# Patient Record
Sex: MALE | Race: WHITE | NOT HISPANIC OR LATINO
[De-identification: names, ages, dates, MRNs, and addresses within clinical notes are randomized per-mention and may not be internally consistent; named-entity substitution may affect disease eponyms.]

---

## 2024-08-05 ENCOUNTER — APPOINTMENT (OUTPATIENT)
Dept: UROLOGY | Facility: CLINIC | Age: 69
End: 2024-08-05

## 2024-08-05 PROBLEM — Z78.9 DRINKS BEER: Status: ACTIVE | Noted: 2024-08-05

## 2024-08-05 PROBLEM — R33.9 URINARY RETENTION: Status: ACTIVE | Noted: 2024-08-05

## 2024-08-05 PROBLEM — Z87.891 FORMER SMOKER: Status: ACTIVE | Noted: 2024-08-05

## 2024-08-05 PROBLEM — N52.9 ERECTILE DISORDER: Status: ACTIVE | Noted: 2024-08-05

## 2024-08-05 PROCEDURE — 99204 OFFICE O/P NEW MOD 45 MIN: CPT | Mod: 25

## 2024-08-05 PROCEDURE — 51798 US URINE CAPACITY MEASURE: CPT

## 2024-08-05 PROCEDURE — 51741 ELECTRO-UROFLOWMETRY FIRST: CPT

## 2024-08-06 PROBLEM — Z00.00 ENCOUNTER FOR PREVENTIVE HEALTH EXAMINATION: Status: ACTIVE | Noted: 2024-08-02

## 2024-08-06 PROBLEM — N48.89 ATROPHY OF CORPUS CAVERNOSUM: Status: ACTIVE | Noted: 2024-08-06

## 2024-08-06 PROBLEM — N48.6 PEYRONIE'S DISEASE: Status: ACTIVE | Noted: 2024-08-06

## 2024-08-06 PROBLEM — N48.89 PENILE CURVATURE, ACQUIRED: Status: ACTIVE | Noted: 2024-08-06

## 2024-08-06 PROBLEM — R35.0 URINARY FREQUENCY: Status: ACTIVE | Noted: 2024-08-05

## 2024-08-06 NOTE — ASSESSMENT
[FreeTextEntry1] : BLADDER SCAN: Indication: Increased frequency of urination. Initial Volume:  232  cc PVR:  31 cc Results: Urinary retention Recommendations: No Therapy Needed.  URO FLOWMETRY: Indication: Increased frequency of urination. Urinary Flow Rate: 9.6   m/s Results: Urinary retention Recommendations: No therapy needed.

## 2024-08-06 NOTE — PHYSICAL EXAM
[General Appearance - Well Developed] : well developed [General Appearance - Well Nourished] : well nourished [Heart Rate And Rhythm] : heart rate and rhythm were normal [] : no respiratory distress [Respiration, Rhythm And Depth] : normal respiratory rhythm and effort [Bowel Sounds] : normal bowel sounds [Abdomen Soft] : soft [TextEntry] : On exam, pt measured: 9 cm w/o traction 14 cm w/ traction  The penis is circumcised.  Severe fibrosis and atrophy of the cavernosal bodies is noted on palpation.  The length of the penis is fixed and inelastic.  The stretched penile length is diminished.  The scrotum and testicles are normal.  The skin of the penile shaft and scrotum is clean and intact.

## 2024-08-06 NOTE — PLAN
[TextEntry] : A/P, 69M w/ ED for initial consult - pt is interested in IPP surgery - cardiac clearance - schedule Duplex and Pre-op testing - Start daily tadalafil 5mg/day, in addition to 10 mg prn The patient scheduled this consultation to discuss the different treatment options available for his organic erectile dysfunction. The following note describes the conversation that was performed today during the consultation.    I reviewed the Patient History Form which the patient filled out, made sure that his ailment was organic erectile dysfunction and I discussed in detail with him all previously tried treatments for his ED. We had a thorough discussion about all of the alternatives available, and I made sure to include in our discussion pills such as Levitra/Viagra/Cialis, as well as penile self-injectable therapies, MUSE (Medicated Urethral System for Erection), vacuum device, and penile implant.  I stressed the risks and benefits and pros and cons of each of these options extensively. A power point presentation was also used to illustrate each treatment option. The patient was also provided with a packet of written information as well as a list of patients to speak to on the phone.   In discussing penile implant surgery, the patient was made aware of the different types of penile implants- including semirigid devices, 2-piece or Ambicor (AMS) devices, and the inflatable penile prosthesis with 3 components. I went on to mention that there are 2 brands of devices, Coloplast and AMS, and that the AMS is impregnated with antibiotic (inhibizone), and the Coloplast is dipped then coated with an antibiotic. I also referred him to my website in order to obtain additional information about the types of implants available.  He felt he would defer to my judgment as to which device to use.   I also described the highlights and benefits of the "No-Touch" surgical technique and outcome data including number of procedures previously performed and updated rate of infection. In this initial discussion of the penile implant option, I made sure we had a very long and nika discussion about the risks.  I stated that, first and foremost, infection is the most dreaded risk and complication, which range in incidence from 1-3% of all cases performed in the [place], but that in my hands, using the "No-Touch" technique the incidence of infection is less than 1%.  I stated that should infection occur, the entire device would need to be removed, which typically happens in the first several weeks after surgery.  I explained that should this occur, there would likely be corporal fibrosis, scarring, penile shortening and even penile necrosis and disfigurement.  I said that while I would do absolutely everything possible to reduce and mitigate this risk, if it occurs, the device will have to be removed, and then a salvage procedure with a semi-rigid implant possibly done, or the device would have to be removed with delayed re-implantation, or simply avoid future surgery completely.  I explained what this salvage procedure is, and that a new implant could be placed in the same setting with a complex irrigation of antibiotics and saline lavage, but that the infection risk at this salvage procedure is even higher, up to 30%. Furthermore, the possible need for hospitalization, prolonged intravenous antibiotics and need further additional surgery was also discussed.  The patient was informed that if the salvage operation failed or if the infected implant were to be removed completely that significant shortening of the penis would occur making implantation of another device very difficult with very poor outcome and patient satisfaction. I explained that this is a real and significant risk that has to be weighed and considered.   Next, I expounded on the other risks of the operation.  These include injury to the urethra or bladder, and should these occur, the operation would have to be altered or aborted.  I explained that very rarely, vascular injury and bleeding can happen, and if iliac vein injury occurs from reservoir placement, this could be catastrophic and result in major blood loss and theoretically risk of leg loss in severe instances.   I went on to discuss that after the implant is placed, penile shortening could likely occur, and this is up to 1-2cm total.  Some of this is due to lack of glans engorgement, though MUSE could be used post-operatively to reduce this factor.  Next, I also explained the risk of dissatisfaction with the cosmetic or functional result of the device, meaning that he could simply be unhappy with the result.  Some people find that while they have a good full erection, they have changes in sensation, difficulty obtaining an orgasm, and dissatisfaction with sex in general.  I made sure he verbalized and demonstrated a good understanding of these points.   Next, I explained the risk of device breakage or failure, and future operations might be needed should this occur to fix the device tubing breakages with fluid leaks.  There is also a risk of auto-inflation, and even inability to successfully use the device due to technical considerations and inability to use or find the pump.  I did state that I would be available to him to teach him and train him to use his device, and also available to treat any other issue mentioned above such as device breakage or auto-inflation.   Next, we discussed the fact that rarely further minor surgery may be needed to make final adjustments to the penile implant. Reasons for this would be to adjust the length of the cylinders, reposition the pump or location of the reservoir.   Prior to scheduling surgery, the patient was asked to read the material, which is provided to him today, to see a cardiologist to obtain a medical clearance, to visit our website [email or web address]   to obtain additional information, to call patients who were previously implanted and to discuss his options with his partner. Before leaving the consultation, I made sure he verbalized understanding all the risk and benefits, and pros and cons of surgery.  He had the ability to ask questions, and I also explained to him what to expect from the surgery.  I made sure he had access to literature to read and offered him the ability to speak to prior patients of mine to get a sense of what to expect, and that these reports would hopefully be as unbiased as possible. If he remains interested in having an implant, he understands that he will need to schedule a penile Duplex ultrasound study during which measurements of the penis will be made.

## 2024-08-06 NOTE — HISTORY OF PRESENT ILLNESS
[FreeTextEntry1] : 69M w/ ED for initial consult Gradual onset of ED 3-5 yrs ago. Reports strong libido and desire. has tried oral PDE5i'sl ; as well daily tadalafil 5mg/day Pt is now interested in IPP surgery MADHAVI score 0  PMH - hyperlipidemia; depression PSH - B/L hernia repair w/ mesh, 1995 Pt has a cardiologist. On baby aspirin.  NO anticoagulant. pt is retired; formerly worked in construction.

## 2024-08-13 NOTE — REASON FOR VISIT
INR (no units)   Date Value   12/05/2022 2.4     INR therapeutic per [facility] anticoag protocol/MD/vo continue same dose recheck 4wks.     Left message for patient to call back     [Follow-up Visit ___] : a follow-up visit  for [unfilled]

## 2024-08-14 ENCOUNTER — APPOINTMENT (OUTPATIENT)
Dept: UROLOGY | Facility: CLINIC | Age: 69
End: 2024-08-14
Payer: MEDICARE

## 2024-08-14 VITALS
SYSTOLIC BLOOD PRESSURE: 126 MMHG | BODY MASS INDEX: 24.34 KG/M2 | DIASTOLIC BLOOD PRESSURE: 82 MMHG | HEIGHT: 70 IN | WEIGHT: 170 LBS | TEMPERATURE: 97.5 F

## 2024-08-14 DIAGNOSIS — R33.9 RETENTION OF URINE, UNSPECIFIED: ICD-10-CM

## 2024-08-14 DIAGNOSIS — N52.9 MALE ERECTILE DYSFUNCTION, UNSPECIFIED: ICD-10-CM

## 2024-08-14 DIAGNOSIS — N48.6 INDURATION PENIS PLASTICA: ICD-10-CM

## 2024-08-14 DIAGNOSIS — R35.0 FREQUENCY OF MICTURITION: ICD-10-CM

## 2024-08-14 DIAGNOSIS — N48.89 OTHER SPECIFIED DISORDERS OF PENIS: ICD-10-CM

## 2024-08-14 DIAGNOSIS — Z00.00 ENCOUNTER FOR GENERAL ADULT MEDICAL EXAMINATION W/OUT ABNORMAL FINDINGS: ICD-10-CM

## 2024-08-14 DIAGNOSIS — N52.02 CORPORO-VENOUS OCCLUSIVE ERECTILE DYSFUNCTION: ICD-10-CM

## 2024-08-14 PROCEDURE — 54235 NJX CORPORA CAVERNOSA RX AGT: CPT

## 2024-08-14 PROCEDURE — 99215 OFFICE O/P EST HI 40 MIN: CPT | Mod: 25

## 2024-08-14 PROCEDURE — 93980 PENILE VASCULAR STUDY: CPT

## 2024-08-14 NOTE — PHYSICAL EXAM
[General Appearance - Well Developed] : well developed [General Appearance - Well Nourished] : well nourished [Heart Rate And Rhythm] : heart rate and rhythm were normal [] : no respiratory distress [Respiration, Rhythm And Depth] : normal respiratory rhythm and effort [Bowel Sounds] : normal bowel sounds [Abdomen Soft] : soft [TextEntry] : The penis is circumcised.  Severe fibrosis and atrophy of the cavernosal bodies is noted on palpation.  The length of the penis is fixed and inelastic.  The stretched penile length is diminished.  The scrotum and testicles are normal.  The skin of the penile shaft and scrotum is clean and intact.

## 2024-08-14 NOTE — HISTORY OF PRESENT ILLNESS
[FreeTextEntry1] : 69M w/ ED for Duplex has tried oral PDE5i's. As well daily tadalafil 5mg/day Pt is now interested in IPP surgery At initial last wk, pt was re-started on 5 mg daily tadalafil  Pt also wants to consider Rezum for LUTS at a later date  PSH - B/L hernia repair w/ mesh, 1995 Pt has a cardiologist. On baby aspirin. No anticoagulant.

## 2024-08-14 NOTE — PLAN
[TextEntry] : A/P, 69M w/ ED for Duplex - duplex today - Pt is interested in IPP surgery (on baby aspirin, has cardiologist) - pt is interested in Rezum. Will consider doing 1-2 mo s/p IPP - cardiac clearance  The summary points of our discussion after the Duplex test are that: 1) The results of the Duplex study were reviewed with the patient and that his ailment is organic erectile dysfunction, refractory to other treatments, or at least other options were offered but rejected. 2) The proposed treatment is the inflatable, permanent, penile prosthesis that we have discussed in great detail at outlined in our previous discussion. 3) The probability of success is over 90%, but this depends on whether or not there are complications.  The complications can be very serious and certainly can occur, including infection, breakage, and injury to surrounding structures.  These events would quality as a "failure", and he understands this completely. 4) The risks are outlined above, but infection is 1-2% in the best circumstances, the chance of other events happening is low but possible, including urethral perforation, bladder perforation, or device breakage. With all of this stated, he decided in light of all of this discussion and different treatment options available, he would like to move forward with 3 piece inflatable penile prosthesis placement.  He was informed that penile implant surgery is an end of the line therapy, and once this is undertaken, one cannot go back and attempt to use injections or pills and expect these to work should the implant be removed for infection or fail to be satisfactory. He understands that he will need to be medically cleared before proceeding with scheduling a dated for the procedure. Following the Duplex study, I spent an additional 35 minutes with the patient.

## 2024-08-14 NOTE — ASSESSMENT
[FreeTextEntry1] : PENILE INJECTION TEST:  FELICIANO GARCIA  08/14/2024   The patient was placed supine on the procedure table.  The left side of the penis was prepped with alcohol, and the patient received 20 mcg @ 0.5 cc of Alprostadil. The patient tolerated the injection well.  No bleeding occurred at the injection site.     The patient was examined at 5, 10, 30 and 45 minutes interval post injection:   The patients penis was:  14    cm stretched  Deformity: 1cm plaque palpable on middle of dorsal shaft Narrowing: Yes, at MIDDLE An hour glass deformity:  Curvature: deviation UPWARDS  Post Injection Erectile Response: At 5 minutes:   40   % rigid erection was noted.  At 15 minutes:  60 % rigidity.   At 30 minutes:   60  % rigidity.  Spontaneous detumescence occurred after 60 minutes.    The patient was discharged with a soft erection and was advised to call should his erection become more rigid.  Post response evaluation by the patient: The patient described that this erection was better than his sexually induced erections.   The erection was not adequate for vaginal penetration. Impression:         Severe organic erectile dysfunction.  Recommendation:   Insertion of inflatable implant  PENILE DUPLEX SONOGRAPHY WITH PULSED DOPPLER ANALYSIS: Procedure description: The flaccid penis was scanned with the 18 MHz probe and images obtained longitudinally.  The diameters of the corporal arteries were measured:   The right cavernosal artery measured: 0.89  mm The left cavernosal artery measured: 1.11   mm   Following intracavernous injection of alprostadil  20   microgram/ml in 0.5  ml, the penis was rescanned and the diameter of the cavernosal arteries were measured again to assess distensibility in response to the vasoactive medication. (A 100% increase in diameter is normally expected.)   The left cavernosal artery measured:  1.40 mm The right cavernosal artery measured:  1.16 mm   Pulsed Doppler analysis: Each of the selective imaged arteries underwent penile Doppler analysis with generation of waveform. The peak velocity data of the cavernosal arteries is tabulated below: (A velocity of 35 cm/s or more is normally expected.)  Resistive index parameters were obtained bilaterally (normal is 100%):   Results: Left cavernosal artery peak systolic velocity at 15 minutes: 19.49  centimeters per second Let cavernosal end-diastolic velocity at 15 minutes:    4.88    centimeters per second Left cavernosal artery resistive index:  0.75     %   Right cavernosal artery peak systolic velocity at 15 minutes: 31.01  centimeters per second Right end-diastolic velocity at 15 minutes:    10.11     centimeters per second Right cavernosal artery resistive index:  0.67   %   Spontaneous detumescence occurred after 60  minutes The patient was discharged with a soft erection and was advised to call should an erection persist for more than 4 hours.    Impression: Arterial distensibility: Normal Arterial peak flow velocities:   Normal Resistive index: Abnormal   Based on the patient's medical history, pertinent physical findings and the above parameters, the patient's diagnosis is corporo venous occlusive erectile dysfunction.      After the Duplex study was terminated, I sat with the patient for 45 minutes and I explained to him the findings of the study. I also discussed his diagnosis with him as well as the recommended course of action. He understands the reason why he has ED and agrees with the plan of action.

## 2024-09-04 DIAGNOSIS — Z01.818 ENCOUNTER FOR OTHER PREPROCEDURAL EXAMINATION: ICD-10-CM

## 2024-09-20 NOTE — HISTORY OF PRESENT ILLNESS
[FreeTextEntry1] : DRAFT  69M w/ ED for Pre Op Visit and Cystoscopy for insertion of IPP on 10/1/24. pt is here for preoperative and postoperative instructions.  Has tried oral PDE5i's. As well daily tadalafil 5mg/day Pt wants to consider Rezum for LUTS at a later date  PSH - B/L hernia repair w/ mesh, 1995 Pt has a cardiologist. On baby aspirin. No anticoagulant.

## 2024-09-20 NOTE — PHYSICAL EXAM
[General Appearance - Well Developed] : well developed [General Appearance - Well Nourished] : well nourished [Heart Rate And Rhythm] : heart rate and rhythm were normal [] : no respiratory distress [Respiration, Rhythm And Depth] : normal respiratory rhythm and effort [Bowel Sounds] : normal bowel sounds [Abdomen Soft] : soft [Chaperone Present] : A chaperone was present in the examining room during all aspects of the physical examination [FreeTextEntry2] : Benjamín Brewer NP

## 2024-09-20 NOTE — ASSESSMENT
[FreeTextEntry1] : CYSTOMETROGRAM: Preop Diagnosis: Increased Frequency of urination.  Postop Diagnosis: Increased Frequency of urination. Anesthesia: 2 % Intraurethral Lidocaine Jelly  Medication: Ciprofloxacin Complications: None  Procedure Note:  The findings observed are described in the cystoscopy report. With the flexible cystocope indwelling into the bladder, a sterile line of intravenous saline was connected into a manometer. This allowed us to measure the amount of fluid instilled and intravesical pressure.   The following findings of the cystometrogram were noted.  Bladder wall compliance: Normal Functional bladder capacity:     cc The patient perceived a first sensation that the bladder was filling with saline at:     cc His first urge to void was observed at    cc of saline.  His post void residual was measured at     cc Impression: Voiding proprioception: normal  Detrusor instability: not present  Summary: Normal study and no contraindication to proceeding with the surgery. The patient tolerated the procedure well.  BLADDER SCAN: Indication: Increased frequency of urination. Initial Volume:    cc PVR:   cc Results: Urinary retention Recommendations: No Therapy Needed.  URO FLOWMETRY: Indication: Increased frequency of urination. Urinary Flow Rate:    m/s Results: Urinary retention Recommendations: No therapy needed.

## 2024-09-20 NOTE — PLAN
[TextEntry] : A/P, 69M w/ ED for Pre Op Visit - Cystoscopy today - insertion of IPP on 10/1/24 - preoperative and postoperative instructions given to pt - on baby aspirin, has cardiologist - pt is interested in Rezum. Will consider doing 1-2 mo s/p IPP  In this pre-operative setting, I spent an additional 65 minutes to the procedures performed today, ensuring that the discussions we had in the office during the patient's initial consultation and penile Doppler visit was still clear in his mind, that he understood the risks and benefits and pros and cons of the penile implant procedure, including treatment alternatives, and that he verbalized the risks and wanted to proceed.  I again made sure he knew that the penile implant is a prosthesis that contains three basic elements consisting of: two parallel hollow cylinders that are placed within the penis, a pump and valve mechanism that is placed in the scrotum, and a spherical reservoir filled with saline that is placed in the lower abdomen. Although the penile prosthesis is placed through an incision in the scrotum, I explained that this is a surgical procedure that is relatively simple but can be more complicated in the presence of scar tissue in the pelvic area due to previous surgery and which will sometimes require an additional or alternative incision for placement of the reservoir, or potentially a narrow-based device if extensive fibrosis of the penis noted.  We reiterated that the penile implant is designed to simulate but not necessarily replace the natural erectile capability that he previously had.  Once implanted, there will be limited or no capability of natural erections occurring in the future and will limit the opportunity for other treatment options such as pills or injections, to successfully work in his body. When inflated, I explained and he understood that the implant will expand in girth, but not length and when deflated, the penis will become flaccid but potentially remain extended and will not retract due to the cylinders that are placed in the penile shaft.  The implant reservoir has a lock-out valve to limit the opportunity for fluid to flow into the cylinders and create an unexpected or unwanted erection, called "Auto Inflation".  Due to the lock-out valve, this rarely occurs, however if the reservoir is allowed to heal with the penile cylinders maintained partially inflated, he recognized that a partial erection will always be present, and it is possible for auto-inflation to occur. We talked about the fact that the penile implant will not grow in length beyond the basic, "stretched penis" level and the measurement, which were obtained during the penile Duplex study.  This is the length he considers to be sufficient for sexual intercourse.  The glans, or tip, of the penis will not expand, as the tip of the cylinders is designed to stabilize and support the glans, but not inflate beyond the penile corona.  He was also counseled that although the results with an inflatable penile prosthesis are very good, the erection will never be as large as the previous normal erection.  The size of the flaccid penis may differ from that before the surgery. He understands that the risk of serious complications during surgery is very low.  There are a number of difficulties that can arise at the time of surgery and are usually overcome.  Rarely the operation cannot be completed or an alternative to an inflatable penile prosthesis placed such as a semi-rigid implant.  The prosthesis is a mechanical device, which may suffer mechanical failure.  The current 10-year survival rate of the inflatable penile prosthesis is greater than 90%.  An operation can be performed to replace a device that has suffered mechanical failure. He has discussed the post-op care and expectations with Clarita and me.  He realizes that it is not uncommon for patients to experience swelling, bruising, pain, irritation, etc for the first four-to-six weeks after the procedure.  Lastly, I made sure again that he understood that, like any surgical procedure, complications may occur.  These complications include but are not limited to infection (1-4%) and erosion (1-11%) (Erosion is when the prosthesis erodes through to the outside of the body).  An infected prosthesis cannot be saved by antibiotics alone and must then be removed.  It is possible to insert prosthesis at the same operative session (salvage) or at a later date, but this is more difficult, and the infection rate is higher. If another prosthesis cannot be inserted at the same time as the infected one removed, significant and irreversible penile shortening will occur.             I asked him if he had any further questions and made sure that he understood all of all the pre-operative instructions which were reviewed with him by Clarita my . He was provided with written pre and postoperative instructions, prescriptions and my private cell number. I informed him that he may call for any questions or concerns at any time.

## 2024-09-23 ENCOUNTER — APPOINTMENT (OUTPATIENT)
Dept: UROLOGY | Facility: CLINIC | Age: 69
End: 2024-09-23
Payer: MEDICARE

## 2024-09-23 ENCOUNTER — APPOINTMENT (OUTPATIENT)
Dept: UROLOGY | Facility: CLINIC | Age: 69
End: 2024-09-23

## 2024-09-23 VITALS
HEIGHT: 70 IN | BODY MASS INDEX: 24.2 KG/M2 | DIASTOLIC BLOOD PRESSURE: 80 MMHG | SYSTOLIC BLOOD PRESSURE: 130 MMHG | TEMPERATURE: 96.4 F | WEIGHT: 169 LBS

## 2024-09-23 DIAGNOSIS — N48.89 OTHER SPECIFIED DISORDERS OF PENIS: ICD-10-CM

## 2024-09-23 DIAGNOSIS — N52.02 CORPORO-VENOUS OCCLUSIVE ERECTILE DYSFUNCTION: ICD-10-CM

## 2024-09-23 DIAGNOSIS — N52.9 MALE ERECTILE DYSFUNCTION, UNSPECIFIED: ICD-10-CM

## 2024-09-23 DIAGNOSIS — R33.9 RETENTION OF URINE, UNSPECIFIED: ICD-10-CM

## 2024-09-23 DIAGNOSIS — R35.0 FREQUENCY OF MICTURITION: ICD-10-CM

## 2024-09-23 DIAGNOSIS — Z01.818 ENCOUNTER FOR OTHER PREPROCEDURAL EXAMINATION: ICD-10-CM

## 2024-09-23 PROCEDURE — 99213 OFFICE O/P EST LOW 20 MIN: CPT

## 2024-09-23 RX ORDER — TRAMADOL HYDROCHLORIDE 50 MG/1
50 TABLET, COATED ORAL
Qty: 14 | Refills: 0 | Status: ACTIVE | COMMUNITY
Start: 2024-09-23 | End: 1900-01-01

## 2024-09-23 RX ORDER — IBUPROFEN 600 MG/1
600 TABLET, FILM COATED ORAL
Qty: 30 | Refills: 1 | Status: ACTIVE | COMMUNITY
Start: 2024-09-23 | End: 1900-01-01

## 2024-09-23 RX ORDER — OXYCODONE AND ACETAMINOPHEN 5; 325 MG/1; MG/1
5-325 TABLET ORAL
Qty: 40 | Refills: 0 | Status: ACTIVE | COMMUNITY
Start: 2024-09-23 | End: 1900-01-01

## 2024-09-23 RX ORDER — CHLORHEXIDINE GLUCONATE 213 G/1000ML
4 SOLUTION TOPICAL
Qty: 1 | Refills: 0 | Status: ACTIVE | COMMUNITY
Start: 2024-09-23 | End: 1900-01-01

## 2024-09-23 RX ORDER — CEPHALEXIN 250 MG/1
250 CAPSULE ORAL
Qty: 84 | Refills: 0 | Status: ACTIVE | COMMUNITY
Start: 2024-09-23 | End: 1900-01-01

## 2024-09-23 RX ORDER — DOCUSATE SODIUM 100 MG/1
100 CAPSULE ORAL TWICE DAILY
Qty: 28 | Refills: 0 | Status: ACTIVE | COMMUNITY
Start: 2024-09-23 | End: 1900-01-01

## 2024-09-23 RX ORDER — MAGNESIUM HYDROXIDE 400 MG/5ML
7.75 SUSPENSION, ORAL (FINAL DOSE FORM) ORAL
Qty: 355 | Refills: 0 | Status: ACTIVE | COMMUNITY
Start: 2024-09-23 | End: 1900-01-01

## 2024-09-25 NOTE — PLAN
[TextEntry] : A/P, 69M w/ ED for Pre Op Visit - bladder scan today - insertion of IPP on 10/1/24 - preoperative and postoperative instructions given to pt - on baby aspirin, has cardiologist - pt is interested in Rezum. Will consider doing 1-2 mo s/p IPP  In this pre-operative setting, I spent an additional 65 minutes to the procedures performed today, ensuring that the discussions we had in the office during the patient's initial consultation and penile Doppler visit was still clear in his mind, that he understood the risks and benefits and pros and cons of the penile implant procedure, including treatment alternatives, and that he verbalized the risks and wanted to proceed.  I again made sure he knew that the penile implant is a prosthesis that contains three basic elements consisting of: two parallel hollow cylinders that are placed within the penis, a pump and valve mechanism that is placed in the scrotum, and a spherical reservoir filled with saline that is placed in the lower abdomen. Although the penile prosthesis is placed through an incision in the scrotum, I explained that this is a surgical procedure that is relatively simple but can be more complicated in the presence of scar tissue in the pelvic area due to previous surgery and which will sometimes require an additional or alternative incision for placement of the reservoir, or potentially a narrow-based device if extensive fibrosis of the penis noted.  We reiterated that the penile implant is designed to simulate but not necessarily replace the natural erectile capability that he previously had.  Once implanted, there will be limited or no capability of natural erections occurring in the future and will limit the opportunity for other treatment options such as pills or injections, to successfully work in his body. When inflated, I explained and he understood that the implant will expand in girth, but not length and when deflated, the penis will become flaccid but potentially remain extended and will not retract due to the cylinders that are placed in the penile shaft.  The implant reservoir has a lock-out valve to limit the opportunity for fluid to flow into the cylinders and create an unexpected or unwanted erection, called "Auto Inflation".  Due to the lock-out valve, this rarely occurs, however if the reservoir is allowed to heal with the penile cylinders maintained partially inflated, he recognized that a partial erection will always be present, and it is possible for auto-inflation to occur. We talked about the fact that the penile implant will not grow in length beyond the basic, "stretched penis" level and the measurement, which were obtained during the penile Duplex study.  This is the length he considers to be sufficient for sexual intercourse.  The glans, or tip, of the penis will not expand, as the tip of the cylinders is designed to stabilize and support the glans, but not inflate beyond the penile corona.  He was also counseled that although the results with an inflatable penile prosthesis are very good, the erection will never be as large as the previous normal erection.  The size of the flaccid penis may differ from that before the surgery. He understands that the risk of serious complications during surgery is very low.  There are a number of difficulties that can arise at the time of surgery and are usually overcome.  Rarely the operation cannot be completed or an alternative to an inflatable penile prosthesis placed such as a semi-rigid implant.  The prosthesis is a mechanical device, which may suffer mechanical failure.  The current 10-year survival rate of the inflatable penile prosthesis is greater than 90%.  An operation can be performed to replace a device that has suffered mechanical failure. He has discussed the post-op care and expectations with Clarita and me.  He realizes that it is not uncommon for patients to experience swelling, bruising, pain, irritation, etc for the first four-to-six weeks after the procedure.  Lastly, I made sure again that he understood that, like any surgical procedure, complications may occur.  These complications include but are not limited to infection (1-4%) and erosion (1-11%) (Erosion is when the prosthesis erodes through to the outside of the body).  An infected prosthesis cannot be saved by antibiotics alone and must then be removed.  It is possible to insert prosthesis at the same operative session (salvage) or at a later date, but this is more difficult, and the infection rate is higher. If another prosthesis cannot be inserted at the same time as the infected one removed, significant and irreversible penile shortening will occur.             I asked him if he had any further questions and made sure that he understood all of all the pre-operative instructions which were reviewed with him by Clarita my . He was provided with written pre and postoperative instructions, prescriptions and my private cell number. I informed him that he may call for any questions or concerns at any time.

## 2024-09-25 NOTE — ASSESSMENT
[FreeTextEntry1] : BLADDER SCAN: Indication: Increased frequency of urination. Initial Volume:  204  cc PVR: 24  cc Results: Urinary retention Recommendations: No Therapy Needed.  URO FLOWMETRY: Indication: Increased frequency of urination. Urinary Flow Rate: 10.8   m/s Results: Urinary retention Recommendations: No therapy needed.

## 2024-09-25 NOTE — HISTORY OF PRESENT ILLNESS
[FreeTextEntry1] : 69M w/ ED for Pre Op Visit and bladder scan for insertion of IPP on 10/1/24. pt is here for preoperative and postoperative instructions.  Has tried oral PDE5i's. As well daily tadalafil 5mg/day Pt wants to consider Rezum for LUTS at a later date (1-2 mo s/p IPP) Now taking 0.8mg tamsulosin QHS and notices a beneficial difference  PSH - B/L hernia repair w/ mesh, 1995 Pt has a cardiologist. On baby aspirin. No anticoagulant.

## 2024-11-14 ENCOUNTER — NON-APPOINTMENT (OUTPATIENT)
Age: 69
End: 2024-11-14

## 2024-11-14 ENCOUNTER — APPOINTMENT (OUTPATIENT)
Dept: UROLOGY | Facility: CLINIC | Age: 69
End: 2024-11-14
Payer: MEDICARE

## 2024-11-14 VITALS
DIASTOLIC BLOOD PRESSURE: 82 MMHG | HEIGHT: 70 IN | TEMPERATURE: 97.1 F | WEIGHT: 169 LBS | BODY MASS INDEX: 24.2 KG/M2 | SYSTOLIC BLOOD PRESSURE: 120 MMHG

## 2024-11-14 DIAGNOSIS — R35.0 FREQUENCY OF MICTURITION: ICD-10-CM

## 2024-11-14 DIAGNOSIS — Z01.818 ENCOUNTER FOR OTHER PREPROCEDURAL EXAMINATION: ICD-10-CM

## 2024-11-14 DIAGNOSIS — N48.89 OTHER SPECIFIED DISORDERS OF PENIS: ICD-10-CM

## 2024-11-14 DIAGNOSIS — N48.6 INDURATION PENIS PLASTICA: ICD-10-CM

## 2024-11-14 DIAGNOSIS — Z00.00 ENCOUNTER FOR GENERAL ADULT MEDICAL EXAMINATION W/OUT ABNORMAL FINDINGS: ICD-10-CM

## 2024-11-14 DIAGNOSIS — N52.02 CORPORO-VENOUS OCCLUSIVE ERECTILE DYSFUNCTION: ICD-10-CM

## 2024-11-14 DIAGNOSIS — N52.9 MALE ERECTILE DYSFUNCTION, UNSPECIFIED: ICD-10-CM

## 2024-11-14 PROCEDURE — 52000 CYSTOURETHROSCOPY: CPT

## 2024-11-14 PROCEDURE — 51725 SIMPLE CYSTOMETROGRAM: CPT

## 2024-11-14 PROCEDURE — 99215 OFFICE O/P EST HI 40 MIN: CPT | Mod: 25

## 2024-11-14 PROCEDURE — 51798 US URINE CAPACITY MEASURE: CPT

## 2024-11-14 PROCEDURE — 51741 ELECTRO-UROFLOWMETRY FIRST: CPT

## 2024-11-18 RX ORDER — CHLORHEXIDINE GLUCONATE 40 MG/ML
1 SOLUTION TOPICAL DAILY
Refills: 0 | Status: DISCONTINUED | OUTPATIENT
Start: 2024-11-19 | End: 2024-11-19

## 2024-11-18 NOTE — ASU PATIENT PROFILE, ADULT - NSICDXPASTSURGICALHX_GEN_ALL_CORE_FT
PAST SURGICAL HISTORY:  H/O bilateral inguinal hernia repair with mesh    History of repair of ACL left knee    History of tonsillectomy

## 2024-11-18 NOTE — ASU PATIENT PROFILE, ADULT - ABLE TO REACH PT
VM arrival 0800 surgery 1100,  parking 22/8 hours, bring picture ID, insurance card, debit or credit card, escort needs picture ID.  NPO after midnight, no jewelry creams lotions, powders, colognes, hard candies, chewing gum. Wear comfortable clothes, do not wear pants that are snug fitting call back [phone]/no

## 2024-11-18 NOTE — ASU PATIENT PROFILE, ADULT - FALL HARM RISK - UNIVERSAL INTERVENTIONS
Bed in lowest position, wheels locked, appropriate side rails in place/Call bell, personal items and telephone in reach/Instruct patient to call for assistance before getting out of bed or chair/Non-slip footwear when patient is out of bed/[place] to call system/Physically safe environment - no spills, clutter or unnecessary equipment/Purposeful Proactive Rounding/Room/bathroom lighting operational, light cord in reach Attending Attestation (For Attendings USE Only)...

## 2024-11-19 ENCOUNTER — APPOINTMENT (OUTPATIENT)
Dept: UROLOGY | Facility: AMBULATORY SURGERY CENTER | Age: 69
End: 2024-11-19

## 2024-11-19 ENCOUNTER — TRANSCRIPTION ENCOUNTER (OUTPATIENT)
Age: 69
End: 2024-11-19

## 2024-11-19 ENCOUNTER — OUTPATIENT (OUTPATIENT)
Dept: OUTPATIENT SERVICES | Facility: HOSPITAL | Age: 69
LOS: 1 days | Discharge: HOME | End: 2024-11-19
Payer: MEDICARE

## 2024-11-19 VITALS
DIASTOLIC BLOOD PRESSURE: 96 MMHG | RESPIRATION RATE: 15 BRPM | OXYGEN SATURATION: 96 % | TEMPERATURE: 98 F | SYSTOLIC BLOOD PRESSURE: 159 MMHG | HEIGHT: 70 IN | WEIGHT: 168.65 LBS | HEART RATE: 78 BPM

## 2024-11-19 VITALS
SYSTOLIC BLOOD PRESSURE: 128 MMHG | RESPIRATION RATE: 14 BRPM | TEMPERATURE: 98 F | DIASTOLIC BLOOD PRESSURE: 81 MMHG | HEART RATE: 88 BPM | OXYGEN SATURATION: 97 %

## 2024-11-19 DIAGNOSIS — Z98.890 OTHER SPECIFIED POSTPROCEDURAL STATES: Chronic | ICD-10-CM

## 2024-11-19 DIAGNOSIS — Z90.89 ACQUIRED ABSENCE OF OTHER ORGANS: Chronic | ICD-10-CM

## 2024-11-19 DIAGNOSIS — N52.9 MALE ERECTILE DYSFUNCTION, UNSPECIFIED: ICD-10-CM

## 2024-11-19 PROCEDURE — 54405 INSERT MULTI-COMP PENIS PROS: CPT | Mod: GC,22

## 2024-11-19 PROCEDURE — 54360 PENIS PLASTIC SURGERY: CPT | Mod: GC

## 2024-11-19 PROCEDURE — 54235 NJX CORPORA CAVERNOSA RX AGT: CPT | Mod: GC

## 2024-11-19 DEVICE — RESERVIOR TITAN CLOVERLEAF 75CC: Type: IMPLANTABLE DEVICE | Status: FUNCTIONAL

## 2024-11-19 DEVICE — IMP PENILE TITAN CYL PUMP SET SCROTAL 0 ANGLE 20CM: Type: IMPLANTABLE DEVICE | Status: FUNCTIONAL

## 2024-11-19 DEVICE — KIT IMP TITAN INFLATABLE PENILE TUBE STRL: Type: IMPLANTABLE DEVICE | Status: FUNCTIONAL

## 2024-11-19 DEVICE — SURGIFLO HEMOSTATIC MATRIX KIT: Type: IMPLANTABLE DEVICE | Status: FUNCTIONAL

## 2024-11-19 RX ORDER — FENTANYL CITRAT/DEXTROSE 5%/PF 1250MCG/50
25 PATIENT CONTROLLED ANALGESIA SYRINGE INTRAVENOUS
Refills: 0 | Status: DISCONTINUED | OUTPATIENT
Start: 2024-11-19 | End: 2024-11-19

## 2024-11-19 RX ORDER — VANCOMYCIN HYDROCHLORIDE 50 MG/ML
1250 KIT ORAL ONCE
Refills: 0 | Status: COMPLETED | OUTPATIENT
Start: 2024-11-19 | End: 2024-11-19

## 2024-11-19 RX ORDER — GENTAMICIN IN NACL, ISO-OSM 60 MG/50ML
240 INTRAVENOUS SOLUTION, PIGGYBACK (ML) INTRAVENOUS ONCE
Refills: 0 | Status: COMPLETED | OUTPATIENT
Start: 2024-11-19 | End: 2024-11-19

## 2024-11-19 RX ORDER — HYDROMORPHONE HCL/0.9% NACL/PF 6 MG/30 ML
0.5 PATIENT CONTROLLED ANALGESIA SYRINGE INTRAVENOUS
Refills: 0 | Status: DISCONTINUED | OUTPATIENT
Start: 2024-11-19 | End: 2024-11-19

## 2024-11-19 RX ORDER — ROSUVASTATIN CALCIUM 10 MG
1 TABLET ORAL
Refills: 0 | DISCHARGE

## 2024-11-19 RX ORDER — EZETIMIBE 10 MG/1
1 TABLET ORAL
Refills: 0 | DISCHARGE

## 2024-11-19 RX ORDER — TAMSULOSIN HCL 0.4 MG
1 CAPSULE ORAL
Refills: 0 | DISCHARGE

## 2024-11-19 RX ORDER — OXYCODONE HYDROCHLORIDE 30 MG/1
5 TABLET ORAL ONCE
Refills: 0 | Status: DISCONTINUED | OUTPATIENT
Start: 2024-11-19 | End: 2024-11-19

## 2024-11-19 RX ORDER — ACETAMINOPHEN 500 MG
1000 TABLET ORAL ONCE
Refills: 0 | Status: DISCONTINUED | OUTPATIENT
Start: 2024-11-19 | End: 2024-11-19

## 2024-11-19 RX ADMIN — Medication 200 MILLILITER(S): at 09:20

## 2024-11-19 RX ADMIN — Medication 200 MILLILITER(S): at 16:59

## 2024-11-19 RX ADMIN — Medication 100 MILLILITER(S): at 19:31

## 2024-11-19 RX ADMIN — VANCOMYCIN HYDROCHLORIDE 166.67 MILLIGRAM(S): KIT at 09:30

## 2024-11-19 RX ADMIN — OXYCODONE HYDROCHLORIDE 5 MILLIGRAM(S): 30 TABLET ORAL at 19:03

## 2024-11-19 RX ADMIN — OXYCODONE HYDROCHLORIDE 5 MILLIGRAM(S): 30 TABLET ORAL at 18:33

## 2024-11-19 RX ADMIN — Medication 100 MILLIGRAM(S): at 12:00

## 2024-11-19 NOTE — ASU DISCHARGE PLAN (ADULT/PEDIATRIC) - ASU DC SPECIAL INSTRUCTIONSFT
INFLATABLE PENILE PROSTHESIS    SURGICAL WOUND: There are often lumps and bumps that can be felt in the scrotum on either or both sides up to two (2) months or more post operatively. These are of no concern and with time they will soften and disappear.  Any “black and blue” bruising areas will also resolve.  Normally, there is also swelling of the scrotum post operatively. Sometimes the tissue fluid which causes the swelling migrates to the penile skin and can look alarming; with time, all the swelling will eventually subside but may take weeks.  A scrotal support and scrotal fluffs should be worn at all times for the next few weeks, unless bathing, to minimize this swelling. You may apply an ice-pack for 15 minutes out of every hour for the first 24 -36 hours to minimize pain and swelling.    STITCHES: Dr Garza can remove the skin stitches when you see him in the office    CATHETER: Some patients are sent home with a Craft catheter, while others go home urinating on their own. A Craft catheter continuously drains the urine from the bladder. If you still have a catheter, the nurses will review instructions and care before you go home. For men, you may have a prescription for lidocaine jelly to apply to the tip of your penis, as needed, for catheter related discomfort. PLEASE REMOVE THE CRAFT CATHETER ON FRIDAY 11/22/24 AND TAKE A WARM BATH AFTERWARDS. The nurses will provide with a 10cc syringe and craft removal instructions    PAIN: You may have some intermittent pain for up to six (6) weeks post operatively. Pain does not signify any problem unless associated with fever, chills, or inability to void.  If you experience any fevers or chills please call immediately as this may be signs of an infection. You may take Tylenol (acetaminophen) 650-975mg and/or Motrin (ibuprofen) 400-600mg, both available over the counter, for pain every 6 hours as needed. Do not exceed 4000mg of Tylenol (acetaminophen) daily. You may alternate these medications such that you take one or the other every 3 hours for around the clock pain coverage.    ANTIBIOTICS: Please continue any antibiotics prescribed by Dr Garza preoperatively    STOOL SOFTENERS: Do not allow yourself to become constipated as straining may cause bleeding. Take stool softeners or a laxative (ex. Miralax, Colace, Senokot, ExLax, etc), available over the counter, if taking Percocet.     BATHING: You may sponge bath 24 hours after surgery, but minimize water to the surgical incision and drain.    DIET: You may resume your regular diet and regular medication regimen.    ACTIVITY: BEDREST for 3 days, remove catheter on post operative day 3. No heavy lifting or strenuous exercise until you are evaluated at your post-operative appointment. Otherwise, you may return to your usual level of physical activity.    FOLLOW-UP: with Dr Garza in ~ 2 weeks    CALL YOUR UROLOGIST IF: You have any bleeding that does not stop, inability to void >8 hours, fever over 100.4 F, chills, persistent nausea/vomiting, changes in your incision concerning for infection, or if your pain is not controlled on your discharge pain medications.

## 2024-11-19 NOTE — ASU PREOP CHECKLIST - NS PREOP CHK HIBICLENS NA
N/A no anorexia/no bladder dysfunction/no bowel dysfunction/no constipation/no difficulty bearing weight/no fatigue/no motor function loss/no neck tenderness/no numbness/no tingling

## 2024-11-19 NOTE — ASU DISCHARGE PLAN (ADULT/PEDIATRIC) - B. DRINK ALCOHOL, BEER, OR WINE
Problem: Potential for Falls  Goal: Patient will remain free of falls  Description: INTERVENTIONS:  - Educate patient/family on patient safety including physical limitations  - Instruct patient to call for assistance with activity   - Consult OT/PT to assist with strengthening/mobility   - Keep Call bell within reach  - Keep bed low and locked with side rails adjusted as appropriate  - Keep care items and personal belongings within reach  - Initiate and maintain comfort rounds  - Make Fall Risk Sign visible to staff  - Offer Toileting every 2 Hours, in advance of need  - Initiate/Maintain bed alarm  - Obtain necessary fall risk management equipment:   - Apply yellow socks and bracelet for high fall risk patients  - Consider moving patient to room near nurses station  Outcome: Progressing     Problem: PAIN - ADULT  Goal: Verbalizes/displays adequate comfort level or baseline comfort level  Description: Interventions:  - Encourage patient to monitor pain and request assistance  - Assess pain using appropriate pain scale  - Administer analgesics based on type and severity of pain and evaluate response  - Implement non-pharmacological measures as appropriate and evaluate response  - Consider cultural and social influences on pain and pain management  - Notify physician/advanced practitioner if interventions unsuccessful or patient reports new pain  Outcome: Progressing     Problem: INFECTION - ADULT  Goal: Absence or prevention of progression during hospitalization  Description: INTERVENTIONS:  - Assess and monitor for signs and symptoms of infection  - Monitor lab/diagnostic results  - Monitor all insertion sites, i.e. indwelling lines, tubes, and drains  - Monitor endotracheal if appropriate and nasal secretions for changes in amount and color  - [place] appropriate cooling/warming therapies per order  - Administer medications as ordered  - Instruct and encourage patient and family to use good hand hygiene  technique  - Identify and instruct in appropriate isolation precautions for identified infection/condition  Outcome: Progressing  Goal: Absence of fever/infection during neutropenic period  Description: INTERVENTIONS:  - Monitor WBC    Outcome: Progressing     Problem: SAFETY ADULT  Goal: Patient will remain free of falls  Description: INTERVENTIONS:  - Educate patient/family on patient safety including physical limitations  - Instruct patient to call for assistance with activity   - Consult OT/PT to assist with strengthening/mobility   - Keep Call bell within reach  - Keep bed low and locked with side rails adjusted as appropriate  - Keep care items and personal belongings within reach  - Initiate and maintain comfort rounds  - Make Fall Risk Sign visible to staff  - Offer Toileting every 2 Hours, in advance of need  - Initiate/Maintain bed alarm  - Obtain necessary fall risk management equipment:   - Apply yellow socks and bracelet for high fall risk patients  - Consider moving patient to room near nurses station  Outcome: Progressing  Goal: Maintain or return to baseline ADL function  Description: INTERVENTIONS:  -  Assess patient's ability to carry out ADLs; assess patient's baseline for ADL function and identify physical deficits which impact ability to perform ADLs (bathing, care of mouth/teeth, toileting, grooming, dressing, etc.)  - Assess/evaluate cause of self-care deficits   - Assess range of motion  - Assess patient's mobility; develop plan if impaired  - Assess patient's need for assistive devices and provide as appropriate  - Encourage maximum independence but intervene and supervise when necessary  - Involve family in performance of ADLs  - Assess for home care needs following discharge   - Consider OT consult to assist with ADL evaluation and planning for discharge  - Provide patient education as appropriate  Outcome: Progressing  Goal: Maintains/Returns to pre admission functional  level  Description: INTERVENTIONS:  - Perform AM-PAC 6 Click Basic Mobility/ Daily Activity assessment daily.  - Set and communicate daily mobility goal to care team and patient/family/caregiver.   - Collaborate with rehabilitation services on mobility goals if consulted  - Perform Range of Motion 3 times a day.  - Reposition patient every 2 hours.  - Dangle patient 3 times a day  - Stand patient 3 times a day  - Ambulate patient 3 times a day  - Out of bed to chair 3 times a day   - Out of bed for meals 3 times a day  - Out of bed for toileting  - Record patient progress and toleration of activity level   Outcome: Progressing     Problem: DISCHARGE PLANNING  Goal: Discharge to home or other facility with appropriate resources  Description: INTERVENTIONS:  - Identify barriers to discharge w/patient and caregiver  - Arrange for needed discharge resources and transportation as appropriate  - Identify discharge learning needs (meds, wound care, etc.)  - Arrange for interpretive services to assist at discharge as needed  - Refer to Case Management Department for coordinating discharge planning if the patient needs post-hospital services based on physician/advanced practitioner order or complex needs related to functional status, cognitive ability, or social support system  Outcome: Progressing     Problem: Knowledge Deficit  Goal: Patient/family/caregiver demonstrates understanding of disease process, treatment plan, medications, and discharge instructions  Description: Complete learning assessment and assess knowledge base.  Interventions:  - Provide teaching at level of understanding  - Provide teaching via preferred learning methods  Outcome: Progressing     Problem: Prexisting or High Potential for Compromised Skin Integrity  Goal: Skin integrity is maintained or improved  Description: INTERVENTIONS:  - Identify patients at risk for skin breakdown  - Assess and monitor skin integrity  - Assess and monitor nutrition  and hydration status  - Monitor labs   - Assess for incontinence   - Turn and reposition patient  - Assist with mobility/ambulation  - Relieve pressure over bony prominences  - Avoid friction and shearing  - Provide appropriate hygiene as needed including keeping skin clean and dry  - Evaluate need for skin moisturizer/barrier cream  - Collaborate with interdisciplinary team   - Patient/family teaching  - Consider wound care consult   Outcome: Progressing      Statement Selected

## 2024-11-19 NOTE — ASU DISCHARGE PLAN (ADULT/PEDIATRIC) - NS MD DC FALL RISK RISK
For information on Fall & Injury Prevention, visit: [email or web address] OR  [email or web address] OR  https://www.cdc.gov/steadi/patient.html

## 2024-11-19 NOTE — PRE-ANESTHESIA EVALUATION ADULT - NSANTHOSAYNRD_GEN_A_CORE
No. CORIE screening performed.  STOP BANG Legend: 0-2 = LOW Risk; 3-4 = INTERMEDIATE Risk; 5-8 = HIGH Risk

## 2024-11-19 NOTE — BRIEF OPERATIVE NOTE - OPERATION/FINDINGS
Insertion of multi-component inflatable penile prosthesis.   Insertion of multi-component inflatable penile prosthesis, reservoir placed in L space of retzius

## 2024-11-19 NOTE — ASU DISCHARGE PLAN (ADULT/PEDIATRIC) - FINANCIAL ASSISTANCE
[facility] provides services at a reduced cost to those who are determined to be eligible through [facility]’s financial assistance program. Information regarding [facility]’s financial assistance program can be found by going to [email or web address] or by calling [phone].

## 2024-11-22 ENCOUNTER — NON-APPOINTMENT (OUTPATIENT)
Age: 69
End: 2024-11-22

## 2024-11-22 PROBLEM — R00.2 PALPITATIONS: Chronic | Status: ACTIVE | Noted: 2024-11-19

## 2024-11-22 PROBLEM — E78.00 PURE HYPERCHOLESTEROLEMIA, UNSPECIFIED: Chronic | Status: ACTIVE | Noted: 2024-11-19

## 2024-11-22 PROBLEM — I25.10 ATHEROSCLEROTIC HEART DISEASE OF NATIVE CORONARY ARTERY WITHOUT ANGINA PECTORIS: Chronic | Status: ACTIVE | Noted: 2024-11-19

## 2024-11-22 PROCEDURE — ZZZZZ: CPT

## 2024-12-03 ENCOUNTER — APPOINTMENT (OUTPATIENT)
Dept: UROLOGY | Facility: CLINIC | Age: 69
End: 2024-12-03

## 2024-12-03 VITALS
HEIGHT: 70 IN | DIASTOLIC BLOOD PRESSURE: 80 MMHG | SYSTOLIC BLOOD PRESSURE: 110 MMHG | BODY MASS INDEX: 24.2 KG/M2 | TEMPERATURE: 97.5 F | WEIGHT: 169 LBS

## 2024-12-03 DIAGNOSIS — N48.89 OTHER SPECIFIED DISORDERS OF PENIS: ICD-10-CM

## 2024-12-03 DIAGNOSIS — N52.9 MALE ERECTILE DYSFUNCTION, UNSPECIFIED: ICD-10-CM

## 2024-12-03 DIAGNOSIS — Z00.00 ENCOUNTER FOR GENERAL ADULT MEDICAL EXAMINATION W/OUT ABNORMAL FINDINGS: ICD-10-CM

## 2024-12-03 DIAGNOSIS — N52.02 CORPORO-VENOUS OCCLUSIVE ERECTILE DYSFUNCTION: ICD-10-CM

## 2024-12-03 DIAGNOSIS — R35.0 FREQUENCY OF MICTURITION: ICD-10-CM

## 2024-12-03 DIAGNOSIS — Z48.816 ENCOUNTER FOR SURGICAL AFTERCARE FOLLOWING SURGERY ON THE GENITOURINARY SYSTEM: ICD-10-CM

## 2024-12-03 PROCEDURE — 99024 POSTOP FOLLOW-UP VISIT: CPT

## 2024-12-04 PROBLEM — Z48.816 AFTERCARE FOLLOWING SURGERY OF THE GENITOURINARY SYSTEM: Status: ACTIVE | Noted: 2024-12-04

## 2024-12-09 ENCOUNTER — NON-APPOINTMENT (OUTPATIENT)
Age: 69
End: 2024-12-09

## 2025-02-20 ENCOUNTER — APPOINTMENT (OUTPATIENT)
Dept: UROLOGY | Facility: CLINIC | Age: 70
End: 2025-02-20
Payer: MEDICARE

## 2025-02-20 VITALS
BODY MASS INDEX: 24.2 KG/M2 | SYSTOLIC BLOOD PRESSURE: 138 MMHG | WEIGHT: 169 LBS | HEIGHT: 70 IN | DIASTOLIC BLOOD PRESSURE: 90 MMHG | TEMPERATURE: 97.8 F

## 2025-02-20 DIAGNOSIS — N48.89 OTHER SPECIFIED DISORDERS OF PENIS: ICD-10-CM

## 2025-02-20 DIAGNOSIS — R35.0 FREQUENCY OF MICTURITION: ICD-10-CM

## 2025-02-20 DIAGNOSIS — T83.490A OTHER MECHANICAL COMPLICATION OF IMPLANTED PENILE PROSTHESIS, INITIAL ENCOUNTER: ICD-10-CM

## 2025-02-20 DIAGNOSIS — N52.02 CORPORO-VENOUS OCCLUSIVE ERECTILE DYSFUNCTION: ICD-10-CM

## 2025-02-20 DIAGNOSIS — Z00.00 ENCOUNTER FOR GENERAL ADULT MEDICAL EXAMINATION W/OUT ABNORMAL FINDINGS: ICD-10-CM

## 2025-02-20 DIAGNOSIS — N52.9 MALE ERECTILE DYSFUNCTION, UNSPECIFIED: ICD-10-CM

## 2025-02-20 DIAGNOSIS — Z96.0 PRESENCE OF UROGENITAL IMPLANTS: ICD-10-CM

## 2025-02-20 DIAGNOSIS — Z48.816 ENCOUNTER FOR SURGICAL AFTERCARE FOLLOWING SURGERY ON THE GENITOURINARY SYSTEM: ICD-10-CM

## 2025-02-20 PROCEDURE — 99213 OFFICE O/P EST LOW 20 MIN: CPT

## (undated) DEVICE — GLV 9 PROTEXIS (WHITE)

## (undated) DEVICE — SUT PROLENE 4-0 14" V-47

## (undated) DEVICE — DRSG TAPE UMBILICAL COTTON 2" X 30 X 1/8"

## (undated) DEVICE — DRSG DERMABOND 0.7ML

## (undated) DEVICE — MARKING PEN DEVON DUAL TIP W RULER

## (undated) DEVICE — BAG SPONGE COUNTER EZ

## (undated) DEVICE — DRSG COMBINE 5 X 9"

## (undated) DEVICE — FOLEY CATH 2-WAY 14FR 5CC SILICONE ELASTOMER LATEX

## (undated) DEVICE — ELCTR PENCIL SMOKE EVACUATOR COATED PUSH BUTTON 70MM

## (undated) DEVICE — PREP CHLORAPREP HI-LITE ORANGE 26ML

## (undated) DEVICE — WARMING BLANKET UPPER ADULT

## (undated) DEVICE — SUT VICRYL PLUS 3-0 27" RB-1 UNDYED

## (undated) DEVICE — SUPP ATHLETIC MALE XLG 44-55IN

## (undated) DEVICE — LONE STAR DILAMEZINSERT INSERTER BLUE 12MM

## (undated) DEVICE — LONE STAR ELASTIC STAY HOOK 12MM BLUNT

## (undated) DEVICE — DRAIN URINARY LEG BAG WITH FLIP-FLO VALVE 32OZ

## (undated) DEVICE — VENODYNE/SCD SLEEVE CALF MEDIUM

## (undated) DEVICE — PREP SCRUB BRUSH W CHG 4%

## (undated) DEVICE — PACK PROST PENILE LNX SURGICOUNT

## (undated) DEVICE — SUT PDS PLUS 3-0 27" RB-1

## (undated) DEVICE — BAG DECANTER IV STERILE